# Patient Record
Sex: MALE | Race: WHITE | NOT HISPANIC OR LATINO | URBAN - METROPOLITAN AREA
[De-identification: names, ages, dates, MRNs, and addresses within clinical notes are randomized per-mention and may not be internally consistent; named-entity substitution may affect disease eponyms.]

---

## 2023-10-05 ENCOUNTER — OFFICE VISIT (OUTPATIENT)
Dept: FAMILY MEDICINE | Facility: CLINIC | Age: 33
End: 2023-10-05
Attending: FAMILY MEDICINE
Payer: COMMERCIAL

## 2023-10-05 VITALS
DIASTOLIC BLOOD PRESSURE: 64 MMHG | HEART RATE: 84 BPM | HEIGHT: 77 IN | SYSTOLIC BLOOD PRESSURE: 120 MMHG | TEMPERATURE: 97 F | WEIGHT: 232.13 LBS | OXYGEN SATURATION: 99 % | RESPIRATION RATE: 18 BRPM | BODY MASS INDEX: 27.41 KG/M2

## 2023-10-05 DIAGNOSIS — L81.9 DISCOLORATION OF SKIN: ICD-10-CM

## 2023-10-05 DIAGNOSIS — R22.2 SUBCUTANEOUS NODULE OF BACK: Primary | ICD-10-CM

## 2023-10-05 DIAGNOSIS — E66.3 OVERWEIGHT (BMI 25.0-29.9): ICD-10-CM

## 2023-10-05 PROCEDURE — 1160F RVW MEDS BY RX/DR IN RCRD: CPT | Mod: CPTII,S$GLB,, | Performed by: FAMILY MEDICINE

## 2023-10-05 PROCEDURE — 3074F SYST BP LT 130 MM HG: CPT | Mod: CPTII,S$GLB,, | Performed by: FAMILY MEDICINE

## 2023-10-05 PROCEDURE — 1159F PR MEDICATION LIST DOCUMENTED IN MEDICAL RECORD: ICD-10-PCS | Mod: CPTII,S$GLB,, | Performed by: FAMILY MEDICINE

## 2023-10-05 PROCEDURE — 99999 PR PBB SHADOW E&M-NEW PATIENT-LVL IV: CPT | Mod: PBBFAC,,, | Performed by: FAMILY MEDICINE

## 2023-10-05 PROCEDURE — 99203 PR OFFICE/OUTPT VISIT, NEW, LEVL III, 30-44 MIN: ICD-10-PCS | Mod: S$GLB,,, | Performed by: FAMILY MEDICINE

## 2023-10-05 PROCEDURE — 3078F PR MOST RECENT DIASTOLIC BLOOD PRESSURE < 80 MM HG: ICD-10-PCS | Mod: CPTII,S$GLB,, | Performed by: FAMILY MEDICINE

## 2023-10-05 PROCEDURE — 99203 OFFICE O/P NEW LOW 30 MIN: CPT | Mod: S$GLB,,, | Performed by: FAMILY MEDICINE

## 2023-10-05 PROCEDURE — 1159F MED LIST DOCD IN RCRD: CPT | Mod: CPTII,S$GLB,, | Performed by: FAMILY MEDICINE

## 2023-10-05 PROCEDURE — 3008F BODY MASS INDEX DOCD: CPT | Mod: CPTII,S$GLB,, | Performed by: FAMILY MEDICINE

## 2023-10-05 PROCEDURE — 1160F PR REVIEW ALL MEDS BY PRESCRIBER/CLIN PHARMACIST DOCUMENTED: ICD-10-PCS | Mod: CPTII,S$GLB,, | Performed by: FAMILY MEDICINE

## 2023-10-05 PROCEDURE — 3008F PR BODY MASS INDEX (BMI) DOCUMENTED: ICD-10-PCS | Mod: CPTII,S$GLB,, | Performed by: FAMILY MEDICINE

## 2023-10-05 PROCEDURE — 3078F DIAST BP <80 MM HG: CPT | Mod: CPTII,S$GLB,, | Performed by: FAMILY MEDICINE

## 2023-10-05 PROCEDURE — 3074F PR MOST RECENT SYSTOLIC BLOOD PRESSURE < 130 MM HG: ICD-10-PCS | Mod: CPTII,S$GLB,, | Performed by: FAMILY MEDICINE

## 2023-10-05 PROCEDURE — 99999 PR PBB SHADOW E&M-NEW PATIENT-LVL IV: ICD-10-PCS | Mod: PBBFAC,,, | Performed by: FAMILY MEDICINE

## 2023-10-05 NOTE — PATIENT INSTRUCTIONS
Please see me in 1 month if skin edmund has not improved to resolved.    Please call Dr. Lugo for your results.    Thanks.

## 2023-10-05 NOTE — PROGRESS NOTES
Praful Rodriguez    Chief Complaint   Patient presents with    SKIN FRANCA ON FOREARM       History of Present Illness:   Mr. Rodriguez comes in today as a new patient with the following complaints.    He complains having franca on his right forearm which he noticed 4 days ago without associated pain or trauma.  He states he is in the sun a lot.  He denies personal history of skin cancer or known family history of skin cancer.  He states he is originally from [place] and his wife is a resident at [employer].    He complains of having small lump at his white flank which he noticed 2-3 months ago.  He states the lump is painful when pressure is applied to it.  He denies having associated trauma.    Otherwise, he denies having fever, chills, fatigue, appetite changes; shortness of breath, cough, wheezing; chest pain, palpitations, leg swelling; abdominal pain, nausea, vomiting, diarrhea, constipation; unusual urinary symptoms; polydipsia, polyphagia, polyuria, hot or cold intolerance; back pain; numbness, headache; anxiety, depression, homicidal or suicidal thoughts.                 No current outpatient medications on file.     :  Review of Systems   Constitutional:  Negative for activity change, appetite change, chills, fatigue and fever.   Respiratory:  Negative for cough, shortness of breath and wheezing.    Cardiovascular:  Negative for chest pain, palpitations and leg swelling.   Gastrointestinal:  Negative for abdominal pain, constipation, diarrhea, nausea and vomiting.   Endocrine: Negative for cold intolerance, heat intolerance, polydipsia, polyphagia and polyuria.   Genitourinary:  Negative for difficulty urinating.   Musculoskeletal:  Negative for back pain.   Skin:  Positive for color change and rash.        See history of present illness.   Neurological:  Negative for numbness and headaches.   Psychiatric/Behavioral:  Negative for dysphoric mood and suicidal ideas. The patient is not nervous/anxious.          Negative for homicidal ideas.       Objective:  Physical Exam  Vitals reviewed.   Constitutional:       General: He is not in acute distress.     Appearance: Normal appearance. He is not ill-appearing, toxic-appearing or diaphoretic.      Comments: Pleasant.   Cardiovascular:      Rate and Rhythm: Normal rate and regular rhythm.      Pulses: Normal pulses.      Heart sounds: No murmur heard.  Pulmonary:      Effort: Pulmonary effort is normal. No respiratory distress.      Breath sounds: Normal breath sounds. No wheezing.   Abdominal:      General: Bowel sounds are normal. There is no distension.      Palpations: Abdomen is soft. There is no mass.      Tenderness: There is no abdominal tenderness. There is no guarding or rebound.   Musculoskeletal:         General: No swelling or tenderness. Normal range of motion.      Cervical back: Normal range of motion and neck supple. No tenderness.      Comments: He is ambulatory without problems.   Lymphadenopathy:      Cervical: No cervical adenopathy.   Skin:     General: Skin is warm.      Comments: See photo attached - flat reddish edmund at right forearm.    Small subcutaneous nodule at right flank/back palpable.   Neurological:      General: No focal deficit present.      Mental Status: He is alert and oriented to person, place, and time.   Psychiatric:         Mood and Affect: Mood normal.         Behavior: Behavior normal.         Thought Content: Thought content normal.         Judgment: Judgment normal.         ASSESSMENT:  1. Subcutaneous nodule of back    2. Discoloration of skin    3. Overweight (BMI 25.0-29.9)        PLAN:  Praful was seen today for skin edmund on forearm.    Diagnoses and all orders for this visit:    Subcutaneous nodule of back  -     US Soft Tissue Lower Back; Future    Discoloration of skin    Overweight (BMI 25.0-29.9)      Patient advised to call for results.  Reassurance regarding skin discoloration; however, advised patient to  monitor and see me in 1 month if skin edmund has not improved to resolved as will need dermatology consultation.  Continue current medications, follow low sodium, low cholesterol, low carb diet, daily walks.  Patient declines flu shot today.   Follow up if symptoms worsen or fail to improve.

## 2023-10-12 PROBLEM — E66.3 OVERWEIGHT (BMI 25.0-29.9): Status: ACTIVE | Noted: 2023-10-12

## 2023-10-12 PROBLEM — R22.2 SUBCUTANEOUS NODULE OF BACK: Status: ACTIVE | Noted: 2023-10-12

## 2023-10-12 PROBLEM — L81.9 DISCOLORATION OF SKIN: Status: ACTIVE | Noted: 2023-10-12

## 2023-11-01 ENCOUNTER — HOSPITAL ENCOUNTER (OUTPATIENT)
Dept: RADIOLOGY | Facility: HOSPITAL | Age: 33
Discharge: HOME | End: 2023-11-01
Attending: FAMILY MEDICINE
Payer: COMMERCIAL

## 2023-11-01 DIAGNOSIS — R22.2 SUBCUTANEOUS NODULE OF BACK: ICD-10-CM

## 2023-11-01 PROCEDURE — 76705 ECHO EXAM OF ABDOMEN: CPT | Mod: TC

## 2023-11-01 PROCEDURE — 76705 US SOFT TISSUE LOWER BACK: ICD-10-PCS | Mod: 26,,, | Performed by: RADIOLOGY

## 2023-11-01 PROCEDURE — 76705 ECHO EXAM OF ABDOMEN: CPT | Mod: 26,,, | Performed by: RADIOLOGY

## 2023-11-07 ENCOUNTER — TELEPHONE (OUTPATIENT)
Dept: FAMILY MEDICINE | Facility: CLINIC | Age: 33
End: 2023-11-07

## 2023-11-07 NOTE — TELEPHONE ENCOUNTER
----- Message from Summer Huber sent at 11/7/2023 12:45 PM CST -----    Type: Test Results:    Who Called:  PT     What Test: ULTRASOUND 11/1/2023    Best Call Back Number:  [phone]    Additional Information: DISCUSS RESULTS

## 2023-11-07 NOTE — TELEPHONE ENCOUNTER
Advise pt U/S is okay. If desires additional testing, can get CT scan.  Just let me know. Thanks for call.